# Patient Record
Sex: MALE | Race: WHITE
[De-identification: names, ages, dates, MRNs, and addresses within clinical notes are randomized per-mention and may not be internally consistent; named-entity substitution may affect disease eponyms.]

---
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
## 2021-10-01 ENCOUNTER — HOSPITAL ENCOUNTER (OUTPATIENT)
Dept: HOSPITAL 47 - RADUSWWP | Age: 21
Discharge: HOME | End: 2021-10-01
Attending: FAMILY MEDICINE
Payer: COMMERCIAL

## 2021-10-01 DIAGNOSIS — R22.1: Primary | ICD-10-CM

## 2021-10-01 PROCEDURE — 76536 US EXAM OF HEAD AND NECK: CPT

## 2021-10-01 NOTE — US
EXAMINATION TYPE: US thyroid st tissue head/ EXAMINATION TYPE: US thyroid st tissue head/neck

 

DATE OF EXAM: 10/1/2021

 

COMPARISON: NONE

 

CLINICAL HISTORY: R22.1 Localized swelling mass and lump neck.

 

Patient states that he no longer feels the lump to the left of his cervical spine.

At this area is an oval hypoechoic structure measuring 1.7 x 0.3 x  0.7 cm

 

 

 

 

IMPRESSION:  

1. Hypoechoic described structure previous palpable abnormality may be a lymph node. Monitoring and f
ollow-up can be performed as clinically indicated.

## 2022-08-06 ENCOUNTER — HOSPITAL ENCOUNTER (EMERGENCY)
Dept: HOSPITAL 47 - EC | Age: 22
Discharge: HOME | End: 2022-08-06
Payer: COMMERCIAL

## 2022-08-06 VITALS — DIASTOLIC BLOOD PRESSURE: 82 MMHG | RESPIRATION RATE: 15 BRPM | SYSTOLIC BLOOD PRESSURE: 123 MMHG | HEART RATE: 98 BPM

## 2022-08-06 VITALS — TEMPERATURE: 98 F

## 2022-08-06 DIAGNOSIS — N13.2: Primary | ICD-10-CM

## 2022-08-06 LAB
HYALINE CASTS UR QL AUTO: 1 /LPF (ref 0–2)
PH UR: 6.5 [PH] (ref 5–8)
RBC UR QL: >182 /HPF (ref 0–5)
SP GR UR: 1.01 (ref 1–1.03)
UROBILINOGEN UR QL STRIP: <2 MG/DL (ref ?–2)
WBC #/AREA URNS HPF: <1 /HPF (ref 0–5)

## 2022-08-06 PROCEDURE — 99284 EMERGENCY DEPT VISIT MOD MDM: CPT

## 2022-08-06 PROCEDURE — 81001 URINALYSIS AUTO W/SCOPE: CPT

## 2022-08-06 PROCEDURE — 74176 CT ABD & PELVIS W/O CONTRAST: CPT

## 2022-08-06 NOTE — ED
Male Urogenital HPI





- General


Stated complaint: urine retention/bladder pain


Time Seen by Provider: 22 04:19


Source: patient


Limitations: no limitations





- History of Present Illness


Initial comments: 





Patient is a 21-year-old man who states that since about 11 PM he has been 

feeling frequent urge to urinate.  He has some discomfort in the suprapubic 

area.


MD Complaint: other


Onset/Timin


-: hour(s)


Location: abdomen


Radiation: none


Severity: moderate


Quality: burning


Consistency: intermittent


Improves with: none


Worsens with: urination


Reports: denies other symptoms





- Related Data


                                  Previous Rx's











 Medication  Instructions  Recorded


 


Ibuprofen [Motrin] 600 mg PO Q8HR PRN #20 tab 19


 


HYDROcodone/APAP 5-325MG [Norco 1 tab PO Q4HR PRN 3 Days #18 tab 22





5-325]  


 


Tamsulosin [Flomax] 0.4 mg PO DAILY #14 cap 22











                                    Allergies











Allergy/AdvReac Type Severity Reaction Status Date / Time


 


No Known Allergies Allergy   Verified 19 20:47














Review of Systems


ROS Statement: 


Those systems with pertinent positive or pertinent negative responses have been 

documented in the HPI.





ROS Other: All systems not noted in ROS Statement are negative.


Constitutional: Denies: fever, chills


Respiratory: Denies: cough, dyspnea


Cardiovascular: Denies: chest pain, palpitations, edema


Gastrointestinal: Denies: abdominal pain, nausea, vomiting, diarrhea


Genitourinary: Reports: dysuria, frequency, testicular pain.  Denies: discharge,

 testicular mass


Musculoskeletal: Denies: back pain


Skin: Denies: rash


Neurological: Denies: headache, weakness





Past Medical History


Past Medical History: No Reported History


History of Any Multi-Drug Resistant Organisms: None Reported


Additional Past Surgical History / Comment(s): wisdom teeth


Past Psychological History: No Psychological Hx Reported


Past Alcohol Use History: None Reported


Past Drug Use History: None Reported





General Exam


General appearance: alert, in no apparent distress


Head exam: Present: atraumatic, normocephalic


Eye exam: Present: normal appearance.  Absent: scleral icterus, conjunctival 

injection


Neck exam: Present: normal inspection


Respiratory exam: Present: normal lung sounds bilaterally.  Absent: respiratory 

distress, wheezes, rales, rhonchi, stridor


Cardiovascular Exam: Present: regular rate, normal rhythm, normal heart sounds. 

 Absent: systolic murmur, diastolic murmur, rubs, gallop


GI/Abdominal exam: Present: soft.  Absent: distended, tenderness, guarding, 

rebound, rigid, mass


 exam: Present: normal inspection.  Absent: testicular tenderness, scrotal 

swelling, vertical testicular lie


Extremities exam: Present: normal inspection, normal capillary refill.  Absent: 

pedal edema, calf tenderness


Neurological exam: Present: alert


Skin exam: Present: warm, dry, intact, normal color.  Absent: rash





Course


                                   Vital Signs











  22





  04:56 06:15 11:12


 


Temperature 98.4 F 98.0 F 


 


Pulse Rate 70 71 98


 


Respiratory 19 16 15





Rate   


 


Blood Pressure 124/68 116/64 123/82


 


O2 Sat by Pulse 98 99 99





Oximetry   














Medical Decision Making





- Lab Data


                                   Lab Results











  22 Range/Units





  04:10 


 


Urine Color  Light Yellow  


 


Urine Appearance  Cloudy  (Clear)  


 


Urine pH  6.5  (5.0-8.0)  


 


Ur Specific Gravity  1.013  (1.001-1.035)  


 


Urine Protein  Negative  (Negative)  


 


Urine Glucose (UA)  Negative  (Negative)  


 


Urine Ketones  Negative  (Negative)  


 


Urine Blood  Large H  (Negative)  


 


Urine Nitrite  Negative  (Negative)  


 


Urine Bilirubin  Negative  (Negative)  


 


Urine Urobilinogen  <2.0  (<2.0)  mg/dL


 


Ur Leukocyte Esterase  Negative  (Negative)  


 


Urine RBC  >182 H  (0-5)  /hpf


 


Urine WBC  <1  (0-5)  /hpf


 


Amorphous Sediment  Occasional H  (None)  /hpf


 


Hyaline Casts  1  (0-2)  /lpf


 


Urine Mucus  Rare H  (None)  /hpf














Disposition


Clinical Impression: 


 Kidney stone on left side





Disposition: HOME SELF-CARE


Condition: Good


Instructions (If sedation given, give patient instructions):  Kidney Stones (ED)


Prescriptions: 


Tamsulosin [Flomax] 0.4 mg PO DAILY #14 cap


HYDROcodone/APAP 5-325MG [Norco 5-325] 1 tab PO Q4HR PRN 3 Days #18 tab


 PRN Reason: Pain


Is patient prescribed a controlled substance at d/c from ED?: No


Referrals: 


Luis Daniel Rueda MD [Primary Care Provider] - 1-2 days


Lloyd Alvarado MD [STAFF PHYSICIAN] - 1-2 days

## 2022-08-06 NOTE — CT
EXAMINATION TYPE: CT abdomen pelvis wo con

CT DLP: 355.6 mGycm, Automated exposure control for dose reduction was used.

 

DATE OF EXAM: 8/6/2022 8:28 AM

 

COMPARISON: None

 

CLINICAL INDICATION:Male, 21 years old with history of stone suspected; Urine retention/bladder pain

 

TECHNIQUE:  Axial CT of the abdomen and pelvis. Sagittal and coronal reformats were created on a [employer] workstation. 

 

Contrast used: None

Oral contrast used: without Oral Contrast 

 

FINDINGS: 

LOWER CHEST: Unremarkable

 

ABDOMEN

LIVER: Unremarkable

GALLBLADDER AND BILE DUCTS: Unremarkable.

PANCREAS: Unremarkable.

SPLEEN: Unremarkable.

ADRENAL GLANDS: Unremarkable.

KIDNEYS AND URETERS: Mild left hydronephrosis secondary obstructing 5 mm calculus at the distal left 
ureter just proximal to ureterovesicular junction.

No evidence of right renal calculus or hydronephrosis.

 

PELVIS

BLADDER: Urinary bladder is distended.

REPRODUCTIVE: Unremarkable.

 

ABDOMEN & PELVIS

STOMACH AND BOWEL: No evidence of bowel obstruction. 

PERITONEUM: No evidence of pneumoperitoneum or free fluid.

 

VASCULATURE: No evidence of aortic aneurysm. 

MUSCULOSKELETAL: No acute osseous abnormalities

LYMPH NODES: No gross evidence for lymphadenopathy.

SOFT TISSUE/ABDOMINAL WALL: Unremarkable

 

IMPRESSION:

Mild left hydronephrosis secondary to a 5 mm calculus at the distal left ureter just proximal to the 
ureterovesicular junction.